# Patient Record
Sex: MALE | Race: BLACK OR AFRICAN AMERICAN | NOT HISPANIC OR LATINO | URBAN - METROPOLITAN AREA
[De-identification: names, ages, dates, MRNs, and addresses within clinical notes are randomized per-mention and may not be internally consistent; named-entity substitution may affect disease eponyms.]

---

## 2024-06-13 ENCOUNTER — OFFICE VISIT (OUTPATIENT)
Dept: UROLOGY | Facility: CLINIC | Age: 79
End: 2024-06-13
Payer: MEDICARE

## 2024-06-13 VITALS
WEIGHT: 202.63 LBS | BODY MASS INDEX: 30.01 KG/M2 | HEIGHT: 69 IN | RESPIRATION RATE: 16 BRPM | HEART RATE: 78 BPM | SYSTOLIC BLOOD PRESSURE: 171 MMHG | DIASTOLIC BLOOD PRESSURE: 93 MMHG

## 2024-06-13 DIAGNOSIS — N52.31 ERECTILE DYSFUNCTION FOLLOWING RADICAL PROSTATECTOMY: ICD-10-CM

## 2024-06-13 DIAGNOSIS — C61 PROSTATE CANCER: Primary | ICD-10-CM

## 2024-06-13 DIAGNOSIS — R31.29 OTHER MICROSCOPIC HEMATURIA: ICD-10-CM

## 2024-06-13 LAB
BILIRUB UR QL STRIP: NEGATIVE
GLUCOSE UR QL STRIP: NEGATIVE
KETONES UR QL STRIP: POSITIVE
LEUKOCYTE ESTERASE UR QL STRIP: NEGATIVE
PH, POC UA: 5
POC BLOOD, URINE: POSITIVE
POC NITRATES, URINE: NEGATIVE
PROT UR QL STRIP: NEGATIVE
SP GR UR STRIP: >=1.03 (ref 1–1.03)
UROBILINOGEN UR STRIP-ACNC: 0.2 (ref 0.3–2.2)

## 2024-06-13 PROCEDURE — 99214 OFFICE O/P EST MOD 30 MIN: CPT | Mod: S$PBB,,, | Performed by: UROLOGY

## 2024-06-13 PROCEDURE — 99203 OFFICE O/P NEW LOW 30 MIN: CPT | Mod: PBBFAC,PN | Performed by: UROLOGY

## 2024-06-13 PROCEDURE — 99999 PR PBB SHADOW E&M-NEW PATIENT-LVL III: CPT | Mod: PBBFAC,,, | Performed by: UROLOGY

## 2024-06-13 PROCEDURE — 99999PBSHW POCT URINALYSIS, DIPSTICK, AUTOMATED, W/O SCOPE: Mod: PBBFAC,,,

## 2024-06-13 PROCEDURE — 81003 URINALYSIS AUTO W/O SCOPE: CPT | Mod: PBBFAC,PN | Performed by: UROLOGY

## 2024-06-13 RX ORDER — CARVEDILOL 12.5 MG/1
12.5 TABLET ORAL 2 TIMES DAILY
COMMUNITY

## 2024-06-13 RX ORDER — OLMESARTAN MEDOXOMIL AND HYDROCHLOROTHIAZIDE 40/12.5 40; 12.5 MG/1; MG/1
1 TABLET ORAL DAILY
COMMUNITY
Start: 2024-04-09

## 2024-06-13 RX ORDER — METFORMIN HYDROCHLORIDE 500 MG/1
500 TABLET ORAL 2 TIMES DAILY
COMMUNITY

## 2024-06-13 RX ORDER — ASPIRIN 81 MG/1
81 TABLET ORAL DAILY
COMMUNITY

## 2024-06-13 RX ORDER — ATORVASTATIN CALCIUM 80 MG/1
80 TABLET, FILM COATED ORAL DAILY
COMMUNITY

## 2024-06-13 NOTE — PROGRESS NOTES
Subjective:       Patient ID: Michael Kim is a 78 y.o. male.    Chief Complaint: Follow-up      History of Present Illness:     Mr Kim is here today with his wife.  He underwent a robotic radical prostatectomy and robotic bilateral pelvic lymphadenectomy for prostate cancer by me several years ago.  All of his PSAs have been undetectable since his surgery.  No urinary incontinence.  Normal urinary flow.  No gross hematuria.  He has ED.  He did not have succuss with oral ED medications in the past.          Past Medical History:   Diagnosis Date    Hypertension     Prostate CA      Family History   Problem Relation Name Age of Onset    No Known Problems Father      No Known Problems Mother      No Known Problems Maternal Aunt      No Known Problems Maternal Uncle      No Known Problems Paternal Aunt      No Known Problems Paternal Uncle      No Known Problems Paternal Grandmother      No Known Problems Paternal Grandfather      No Known Problems Maternal Grandmother      No Known Problems Maternal Grandfather       Social History     Socioeconomic History    Marital status:    Tobacco Use    Smoking status: Former     Types: Cigarettes    Smokeless tobacco: Never   Substance and Sexual Activity    Alcohol use: Not Currently    Drug use: Never    Sexual activity: Yes     Partners: Female     Social Determinants of Health     Financial Resource Strain: Low Risk  (9/16/2020)    Received from [facility] and Its Subsidiaries and Affiliates    Overall Financial Resource Strain (CARDIA)     Difficulty of Paying Living Expenses: Not hard at all   Food Insecurity: No Food Insecurity (9/16/2020)    Received from [facility] and Its Subsidiaries and Affiliates    Hunger Vital Sign     Worried About Running Out of Food in the Last Year: Never true     Ran Out of Food in the Last Year: Never true   Transportation Needs: No Transportation  "Needs (9/16/2020)    Received from [facility] Subsidiaries and Affiliates    [facility]     Lack of Transportation (Medical): No     Lack of Transportation (Non-Medical): No     Outpatient Encounter Medications as of 6/13/2024   Medication Sig Dispense Refill    aspirin (ECOTRIN) 81 MG EC tablet Take 81 mg by mouth once daily.      atorvastatin (LIPITOR) 80 MG tablet Take 80 mg by mouth once daily.      carvediloL (COREG) 12.5 MG tablet Take 12.5 mg by mouth 2 (two) times daily.      metFORMIN (GLUCOPHAGE) 500 MG tablet Take 500 mg by mouth 2 (two) times daily.      olmesartan-hydrochlorothiazide (BENICAR HCT) 40-12.5 mg Tab Take 1 tablet by mouth once daily.       No facility-administered encounter medications on file as of 6/13/2024.        Review of Systems   Constitutional:  Negative for chills and fever.   Respiratory:  Negative for shortness of breath.    Cardiovascular:  Negative for chest pain.   Gastrointestinal:  Negative for nausea and vomiting.   Genitourinary:  Negative for difficulty urinating, dysuria and hematuria.   Musculoskeletal:  Negative for back pain.   Skin:  Negative for rash.   Neurological:  Negative for dizziness.   Psychiatric/Behavioral:  Negative for agitation.        Objective:     BP (!) 171/93 (BP Location: Left arm, Patient Position: Sitting)   Pulse 78   Resp 16   Ht 5' 8.5" (1.74 m)   Wt 91.9 kg (202 lb 9.6 oz)   BMI 30.36 kg/m²     Physical Exam  Constitutional:       Appearance: Normal appearance.   Pulmonary:      Effort: Pulmonary effort is normal.   Abdominal:      Palpations: Abdomen is soft.   Neurological:      Mental Status: He is alert and oriented to person, place, and time.   Psychiatric:         Mood and Affect: Mood normal.         Office Visit on 06/13/2024   Component Date Value Ref Range Status    POC Blood, Urine 06/13/2024 Positive (A)  Negative Final    POC Bilirubin, Urine 06/13/2024 Negative  " Negative Final    POC Urobilinogen, Urine 06/13/2024 0.2 (A)  0.3 - 2.2 Final    POC Ketones, Urine 06/13/2024 Positive (A)  Negative Final    POC Protein, Urine 06/13/2024 Negative  Negative Final    POC Nitrates, Urine 06/13/2024 Negative  Negative Final    POC Glucose, Urine 06/13/2024 Negative  Negative Final    pH, UA 06/13/2024 5.0   Final    POC Specific Gravity, Urine 06/13/2024 >=1.030  1.003 - 1.029 Final    POC Leukocytes, Urine 06/13/2024 Negative  Negative Final        No results found for this or any previous visit (from the past 8760 hour(s)).     Assessment:       1. Prostate cancer    2. Other microscopic hematuria    3. Erectile dysfunction following radical prostatectomy      Plan:     Orders Placed This Encounter    PROSTATE SPECIFIC ANTIGEN, DIAGNOSTIC    POCT Urinalysis, Dipstick, Automated, W/O Scope          12-5-23  Cr 0.98    12-5-23  HgbA1c 6.4    I reviewed the above lab results.         Assessment:  - Prostate cancer s/p robotic radical prostatectomy and robotic bilateral pelvic lymphadenectomy by me several years ago.  All of his PSAs have been undetectable since his surgery.  - Trace amount of microscopic hematuria today on 6-13-24.  He says he quit smoking in 2013.  He says he smoked for about 30 years in the past.  He takes aspirin 81 mg daily.  - ED.  He did not have succuss with oral ED medications in the past.    Plan:  - PSA tomorrow.  - The mutual decision was made to proceed with observation of his trace amount of MSH.  Will plan to recheck a UA at his 6 month appointment.  I told him to notify the office for any visible blood in his urine.  - I discussed options with him today regarding his ED including penile injections.  He says he wants to hold off on further treatment of his ED at this time.  - RTC in 6 months.

## 2024-06-14 ENCOUNTER — LAB VISIT (OUTPATIENT)
Dept: LAB | Facility: HOSPITAL | Age: 79
End: 2024-06-14
Attending: UROLOGY
Payer: MEDICARE

## 2024-06-14 DIAGNOSIS — C61 PROSTATE CANCER: ICD-10-CM

## 2024-06-14 LAB — COMPLEXED PSA SERPL-MCNC: <0.01 NG/ML (ref 0–4)

## 2024-06-14 PROCEDURE — 36415 COLL VENOUS BLD VENIPUNCTURE: CPT | Mod: PN | Performed by: UROLOGY

## 2024-06-14 PROCEDURE — 84153 ASSAY OF PSA TOTAL: CPT | Performed by: UROLOGY

## 2024-06-17 DIAGNOSIS — C61 PROSTATE CANCER: Primary | ICD-10-CM

## 2024-12-09 ENCOUNTER — LAB VISIT (OUTPATIENT)
Dept: LAB | Facility: HOSPITAL | Age: 79
End: 2024-12-09
Attending: UROLOGY
Payer: MEDICARE

## 2024-12-09 DIAGNOSIS — C61 PROSTATE CANCER: ICD-10-CM

## 2024-12-09 LAB — COMPLEXED PSA SERPL-MCNC: <0.01 NG/ML (ref 0–4)

## 2024-12-09 PROCEDURE — 36415 COLL VENOUS BLD VENIPUNCTURE: CPT | Mod: PN | Performed by: UROLOGY

## 2024-12-09 PROCEDURE — 84153 ASSAY OF PSA TOTAL: CPT | Performed by: UROLOGY

## 2024-12-12 ENCOUNTER — OFFICE VISIT (OUTPATIENT)
Dept: UROLOGY | Facility: CLINIC | Age: 79
End: 2024-12-12
Payer: MEDICARE

## 2024-12-12 VITALS
HEART RATE: 73 BPM | RESPIRATION RATE: 18 BRPM | BODY MASS INDEX: 29.61 KG/M2 | DIASTOLIC BLOOD PRESSURE: 86 MMHG | WEIGHT: 199.94 LBS | SYSTOLIC BLOOD PRESSURE: 165 MMHG | HEIGHT: 69 IN

## 2024-12-12 DIAGNOSIS — C61 PROSTATE CANCER: Primary | ICD-10-CM

## 2024-12-12 DIAGNOSIS — N52.31 ERECTILE DYSFUNCTION FOLLOWING RADICAL PROSTATECTOMY: ICD-10-CM

## 2024-12-12 LAB
BILIRUB UR QL STRIP: NEGATIVE
GLUCOSE UR QL STRIP: NEGATIVE
KETONES UR QL STRIP: NEGATIVE
LEUKOCYTE ESTERASE UR QL STRIP: NEGATIVE
PH, POC UA: 5
POC BLOOD, URINE: NEGATIVE
POC NITRATES, URINE: NEGATIVE
PROT UR QL STRIP: NEGATIVE
SP GR UR STRIP: 1.02 (ref 1–1.03)
UROBILINOGEN UR STRIP-ACNC: 0.2 (ref 0.3–2.2)

## 2024-12-12 PROCEDURE — 99213 OFFICE O/P EST LOW 20 MIN: CPT | Mod: PBBFAC,PN | Performed by: UROLOGY

## 2024-12-12 PROCEDURE — 99214 OFFICE O/P EST MOD 30 MIN: CPT | Mod: S$PBB,,, | Performed by: UROLOGY

## 2024-12-12 PROCEDURE — 99999 PR PBB SHADOW E&M-EST. PATIENT-LVL III: CPT | Mod: PBBFAC,,, | Performed by: UROLOGY

## 2024-12-12 PROCEDURE — 81003 URINALYSIS AUTO W/O SCOPE: CPT | Mod: PBBFAC,PN | Performed by: UROLOGY

## 2024-12-12 PROCEDURE — 99999PBSHW POCT URINALYSIS, DIPSTICK, AUTOMATED, W/O SCOPE: Mod: PBBFAC,,,

## 2024-12-12 NOTE — PROGRESS NOTES
Subjective:       Patient ID: Michael Kim is a 79 y.o. male.    Chief Complaint: Prostate Cancer (6 month follow up)      History of Present Illness:     Mr Kim underwent a robotic radical prostatectomy and robotic bilateral pelvic lymphadenectomy for prostate cancer by me on 4-2-17.  Path:  Prostate adenocarcinoma catherine 4+5=9, margins uninvolved, pT2cN0.  He has not had any further prostate cancer treatments.  All of his PSAs have been undetectable since his surgery.  No gross hematuria.  No urinary incontinence.  No LUTS.   He has ED.  He did not have succuss with oral ED medications in the past.         Past Medical History:   Diagnosis Date    Hypertension     Prostate CA      Family History   Problem Relation Name Age of Onset    No Known Problems Father      No Known Problems Mother      No Known Problems Maternal Aunt      No Known Problems Maternal Uncle      No Known Problems Paternal Aunt      No Known Problems Paternal Uncle      No Known Problems Paternal Grandmother      No Known Problems Paternal Grandfather      No Known Problems Maternal Grandmother      No Known Problems Maternal Grandfather       Social History     Socioeconomic History    Marital status:    Tobacco Use    Smoking status: Former     Types: Cigarettes    Smokeless tobacco: Never   Substance and Sexual Activity    Alcohol use: Not Currently    Drug use: Never    Sexual activity: Yes     Partners: Female     Social Drivers of Health     Financial Resource Strain: Low Risk  (9/16/2020)    Received from [facility] Its Subsidiaries and Affiliates    Overall Financial Resource Strain (CARDIA)     Difficulty of Paying Living Expenses: Not hard at all   Food Insecurity: No Food Insecurity (9/16/2020)    Received from [facility] Its Subsidiaries and Affiliates    Hunger Vital Sign     Worried About Running Out of Food in the Last Year: Never  "true     Ran Out of Food in the Last Year: Never true   Transportation Needs: No Transportation Needs (9/16/2020)    Received from [facility]sidiaries and Affiliates    [facility] - Transportation     Lack of Transportation (Medical): No     Lack of Transportation (Non-Medical): No     Outpatient Encounter Medications as of 12/12/2024   Medication Sig Dispense Refill    aspirin (ECOTRIN) 81 MG EC tablet Take 81 mg by mouth once daily.      atorvastatin (LIPITOR) 80 MG tablet Take 80 mg by mouth once daily.      carvediloL (COREG) 12.5 MG tablet Take 12.5 mg by mouth 2 (two) times daily.      metFORMIN (GLUCOPHAGE) 500 MG tablet Take 500 mg by mouth 2 (two) times daily.      olmesartan-hydrochlorothiazide (BENICAR HCT) 40-12.5 mg Tab Take 1 tablet by mouth once daily.       No facility-administered encounter medications on file as of 12/12/2024.        Review of Systems   Constitutional:  Negative for chills and fever.   Respiratory:  Negative for shortness of breath.    Cardiovascular:  Negative for chest pain.   Gastrointestinal:  Negative for nausea and vomiting.   Genitourinary:  Negative for difficulty urinating and hematuria.   Musculoskeletal:  Negative for back pain.   Skin:  Negative for rash.   Neurological:  Negative for dizziness.   Psychiatric/Behavioral:  Negative for agitation.        Objective:     BP (!) 165/86 (BP Location: Right arm, Patient Position: Sitting)   Pulse 73   Resp 18   Ht 5' 8.5" (1.74 m)   Wt 90.7 kg (199 lb 15.3 oz)   BMI 29.96 kg/m²     Physical Exam  Constitutional:       Appearance: Normal appearance.   Pulmonary:      Effort: Pulmonary effort is normal.   Abdominal:      Palpations: Abdomen is soft.   Neurological:      Mental Status: He is alert and oriented to person, place, and time.   Psychiatric:         Mood and Affect: Mood normal.         Office Visit on 12/12/2024   Component Date Value Ref Range Status    POC Blood, " Urine 12/12/2024 Negative  Negative Final    POC Bilirubin, Urine 12/12/2024 Negative  Negative Final    POC Urobilinogen, Urine 12/12/2024 0.2 (A)  0.3 - 2.2 Final    POC Ketones, Urine 12/12/2024 Negative  Negative Final    POC Protein, Urine 12/12/2024 Negative  Negative Final    POC Nitrates, Urine 12/12/2024 Negative  Negative Final    POC Glucose, Urine 12/12/2024 Negative  Negative Final    pH, UA 12/12/2024 5.0   Final    POC Specific Gravity, Urine 12/12/2024 1.020  1.003 - 1.029 Final    POC Leukocytes, Urine 12/12/2024 Negative  Negative Final        No results found for this or any previous visit (from the past 8760 hours).     Assessment:       1. Prostate cancer    2. Erectile dysfunction following radical prostatectomy        Plan:     Orders Placed This Encounter    PROSTATE SPECIFIC ANTIGEN, DIAGNOSTIC    POCT Urinalysis, Dipstick, Automated, W/O Scope        12-9-24  PSA <0.01    6-14-24  PSA <0.01    2-13-23  PSA <0.006    10-7-24  Cr 1.07.  GFR 71.     10-7-24  HgbA1c 6.3     I reviewed the above lab results.            Assessment:  - Prostate cancer s/p robotic radical prostatectomy and robotic bilateral pelvic lymphadenectomy by me on 4-2-17.  Path:  Prostate adenocarcinoma catherine 4+5=9, margins uninvolved, pT2cN0.  He has not had any further prostate cancer treatments.  All of his PSAs have been undetectable since his surgery.  - Trace amount of microscopic hematuria on 6-13-24 but none today on 12-12-24.  He quit smoking in 2013.  He smoked for about 30 years in the past.  He takes aspirin 81 mg daily.  - ED.  He did not have succuss with oral ED medications in the past.     Plan:  - I discussed options with him today regarding his ED including penile injections.  He is not interested treatment of his ED at this time.  - I discussed dietary modifications with him today and I recommended he drink mostly water during the day.   - PSA in 1 year a few days prior to a 1 year appointment.